# Patient Record
Sex: MALE | Race: OTHER | NOT HISPANIC OR LATINO
[De-identification: names, ages, dates, MRNs, and addresses within clinical notes are randomized per-mention and may not be internally consistent; named-entity substitution may affect disease eponyms.]

---

## 2019-07-23 PROBLEM — Z00.00 ENCOUNTER FOR PREVENTIVE HEALTH EXAMINATION: Status: ACTIVE | Noted: 2019-07-23

## 2019-07-25 ENCOUNTER — APPOINTMENT (OUTPATIENT)
Dept: CARDIOLOGY | Facility: CLINIC | Age: 74
End: 2019-07-25
Payer: MEDICARE

## 2019-07-25 VITALS
WEIGHT: 165 LBS | HEART RATE: 55 BPM | SYSTOLIC BLOOD PRESSURE: 142 MMHG | DIASTOLIC BLOOD PRESSURE: 76 MMHG | HEIGHT: 66 IN | BODY MASS INDEX: 26.52 KG/M2 | RESPIRATION RATE: 12 BRPM

## 2019-07-25 DIAGNOSIS — I25.10 ATHEROSCLEROTIC HEART DISEASE OF NATIVE CORONARY ARTERY W/OUT ANGINA PECTORIS: ICD-10-CM

## 2019-07-25 DIAGNOSIS — E78.5 HYPERLIPIDEMIA, UNSPECIFIED: ICD-10-CM

## 2019-07-25 DIAGNOSIS — Z95.1 PRESENCE OF AORTOCORONARY BYPASS GRAFT: ICD-10-CM

## 2019-07-25 DIAGNOSIS — I10 ESSENTIAL (PRIMARY) HYPERTENSION: ICD-10-CM

## 2019-07-25 PROCEDURE — 99205 OFFICE O/P NEW HI 60 MIN: CPT

## 2019-07-25 PROCEDURE — 93000 ELECTROCARDIOGRAM COMPLETE: CPT

## 2019-07-25 RX ORDER — ROSUVASTATIN CALCIUM 5 MG/1
5 TABLET, FILM COATED ORAL
Refills: 0 | Status: ACTIVE | COMMUNITY
Start: 2019-07-25

## 2019-07-25 RX ORDER — ASPIRIN 81 MG/1
81 TABLET ORAL
Refills: 0 | Status: ACTIVE | COMMUNITY
Start: 2019-07-25

## 2019-07-25 RX ORDER — METOPROLOL TARTRATE 25 MG/1
25 TABLET, FILM COATED ORAL
Refills: 0 | Status: ACTIVE | COMMUNITY
Start: 2019-07-25

## 2019-07-25 NOTE — PHYSICAL EXAM
[General Appearance - Well Developed] : well developed [Normal Appearance] : normal appearance [Well Groomed] : well groomed [General Appearance - Well Nourished] : well nourished [No Deformities] : no deformities [General Appearance - In No Acute Distress] : no acute distress [Normal Conjunctiva] : the conjunctiva exhibited no abnormalities [Eyelids - No Xanthelasma] : the eyelids demonstrated no xanthelasmas [Normal Oral Mucosa] : normal oral mucosa [No Oral Pallor] : no oral pallor [No Oral Cyanosis] : no oral cyanosis [Normal Jugular Venous A Waves Present] : normal jugular venous A waves present [Normal Jugular Venous V Waves Present] : normal jugular venous V waves present [No Jugular Venous Cain A Waves] : no jugular venous cain A waves [Heart Rate And Rhythm] : heart rate and rhythm were normal [Heart Sounds] : normal S1 and S2 [FreeTextEntry1] : 2/6 CARLOS RUSB midline scar [Respiration, Rhythm And Depth] : normal respiratory rhythm and effort [Exaggerated Use Of Accessory Muscles For Inspiration] : no accessory muscle use [Auscultation Breath Sounds / Voice Sounds] : lungs were clear to auscultation bilaterally [Abdomen Soft] : soft [Abdomen Tenderness] : non-tender [Abdomen Mass (___ Cm)] : no abdominal mass palpated [Nail Clubbing] : no clubbing of the fingernails [Cyanosis, Localized] : no localized cyanosis [Petechial Hemorrhages (___cm)] : no petechial hemorrhages [Skin Color & Pigmentation] : normal skin color and pigmentation [] : no rash [No Venous Stasis] : no venous stasis [Skin Lesions] : no skin lesions [No Skin Ulcers] : no skin ulcer [No Xanthoma] : no  xanthoma was observed [Oriented To Time, Place, And Person] : oriented to person, place, and time [Affect] : the affect was normal [Mood] : the mood was normal [No Anxiety] : not feeling anxious

## 2019-07-25 NOTE — HISTORY OF PRESENT ILLNESS
[FreeTextEntry1] : 1. HTN: on medications, controlled.\par 2. Hyperlipidemia: on statin.\par 3. CAD CABG: patient is on ASA. Due to recent onset of SOB, patient was admitted to [facility]. Echo there showed moderate MR, moderate to sesvere TR, pHTN. Patient continues to have SOB with exertion, occurring on a daily basis, associated with bilateral leg edema and limiting his ET. His symptoms are increasing in severity and duration since the last month.

## 2019-07-25 NOTE — REASON FOR VISIT
[Initial Evaluation] : an initial evaluation of [Coronary Artery Disease] : coronary artery disease [CABG Follow-up] : bypass graft [Hyperlipidemia] : hyperlipidemia [Hypertension] : hypertension [FreeTextEntry1] : 73 M HTN DM hyperlipidemia CAD CABG with SOB.

## 2019-07-25 NOTE — DISCUSSION/SUMMARY
[FreeTextEntry1] : 73 M HTN hyperlipidemia CAD CABG 2009 DM with worsening SOB.\par CHECK NST TO ASSESS PERFUSION (limited ET).\par Continue meds for HTN and hyperlipidemia.\par Continue ASA.

## 2019-09-12 ENCOUNTER — APPOINTMENT (OUTPATIENT)
Dept: CARDIOLOGY | Facility: CLINIC | Age: 74
End: 2019-09-12
Payer: MEDICARE

## 2019-09-12 VITALS
WEIGHT: 173 LBS | BODY MASS INDEX: 27.8 KG/M2 | HEIGHT: 66 IN | RESPIRATION RATE: 16 BRPM | HEART RATE: 62 BPM | DIASTOLIC BLOOD PRESSURE: 60 MMHG | SYSTOLIC BLOOD PRESSURE: 122 MMHG

## 2019-09-12 PROCEDURE — 99214 OFFICE O/P EST MOD 30 MIN: CPT

## 2019-09-12 RX ORDER — NITROGLYCERIN 0.4 MG/1
0.4 TABLET SUBLINGUAL
Qty: 50 | Refills: 5 | Status: ACTIVE | COMMUNITY
Start: 2019-09-12 | End: 1900-01-01

## 2019-09-16 ENCOUNTER — APPOINTMENT (OUTPATIENT)
Dept: CARDIOLOGY | Facility: CLINIC | Age: 74
End: 2019-09-16

## 2019-09-26 NOTE — DISCUSSION/SUMMARY
[FreeTextEntry1] : 73 M HTN hyperlipidemia CAD CABG severe TR for f/u.\par Patient has recurrent exertional SOB and CP despite medical treatment. \par REFER FOR CARDIAC CATHETERIZATION DUE TO PROGRESSIVE ANGINA.\par Start NG prn for CP.\par Continue ASA and statin.\par Continue meds for HTN control.

## 2019-09-26 NOTE — REASON FOR VISIT
[Follow-Up - Clinic] : a clinic follow-up of [Coronary Artery Disease] : coronary artery disease [CABG Follow-up] : bypass graft [Hypertension] : hypertension [FreeTextEntry1] : 73 M HTN DM hyperlipidemia CAD CABG with SOB.

## 2019-09-26 NOTE — HISTORY OF PRESENT ILLNESS
[FreeTextEntry1] : 1. HTN: on medications, no SE noted. \par 2. Hyperlipidemia: on statin. LIpid profile is followed by PMD.\par 3. CAD CABG: patient is on ASA. Due to recent onset of SOB, patient was admitted to [facility]. Echo there showed moderate MR, moderate to sesvere TR, pHTN. Patient continues to have SOB with exertion, occurring on a daily basis, associated with bilateral leg edema and limiting his ET. His symptoms are increasing in severity and duration since the last month. \par  He had an NST. \par Patient now reports improvement in exertional SOB.\par

## 2019-09-26 NOTE — PHYSICAL EXAM
[Normal Appearance] : normal appearance [General Appearance - Well Developed] : well developed [Well Groomed] : well groomed [General Appearance - Well Nourished] : well nourished [General Appearance - In No Acute Distress] : no acute distress [No Deformities] : no deformities [Normal Conjunctiva] : the conjunctiva exhibited no abnormalities [Eyelids - No Xanthelasma] : the eyelids demonstrated no xanthelasmas [No Oral Pallor] : no oral pallor [Normal Oral Mucosa] : normal oral mucosa [No Oral Cyanosis] : no oral cyanosis [Normal Jugular Venous A Waves Present] : normal jugular venous A waves present [No Jugular Venous Cain A Waves] : no jugular venous cain A waves [Normal Jugular Venous V Waves Present] : normal jugular venous V waves present [Respiration, Rhythm And Depth] : normal respiratory rhythm and effort [Exaggerated Use Of Accessory Muscles For Inspiration] : no accessory muscle use [Auscultation Breath Sounds / Voice Sounds] : lungs were clear to auscultation bilaterally [Heart Rate And Rhythm] : heart rate and rhythm were normal [Heart Sounds] : normal S1 and S2 [Abdomen Tenderness] : non-tender [Abdomen Soft] : soft [Abdomen Mass (___ Cm)] : no abdominal mass palpated [Nail Clubbing] : no clubbing of the fingernails [Cyanosis, Localized] : no localized cyanosis [Skin Color & Pigmentation] : normal skin color and pigmentation [Petechial Hemorrhages (___cm)] : no petechial hemorrhages [No Venous Stasis] : no venous stasis [] : no rash [Skin Lesions] : no skin lesions [No Skin Ulcers] : no skin ulcer [No Xanthoma] : no  xanthoma was observed [Affect] : the affect was normal [Oriented To Time, Place, And Person] : oriented to person, place, and time [No Anxiety] : not feeling anxious [Mood] : the mood was normal [FreeTextEntry1] : 2/6 CARLOS RUSB midline scar

## 2019-09-27 VITALS
OXYGEN SATURATION: 100 % | SYSTOLIC BLOOD PRESSURE: 134 MMHG | WEIGHT: 173.06 LBS | HEIGHT: 65 IN | TEMPERATURE: 98 F | DIASTOLIC BLOOD PRESSURE: 61 MMHG | HEART RATE: 67 BPM

## 2019-09-27 RX ORDER — METOPROLOL TARTRATE 50 MG
1 TABLET ORAL
Qty: 0 | Refills: 0 | DISCHARGE

## 2019-09-27 RX ORDER — CHLORHEXIDINE GLUCONATE 213 G/1000ML
1 SOLUTION TOPICAL ONCE
Refills: 0 | Status: DISCONTINUED | OUTPATIENT
Start: 2019-10-01 | End: 2019-10-01

## 2019-09-27 NOTE — H&P ADULT - HISTORY OF PRESENT ILLNESS
SKELETON     75 yo male with a PMhx of HTN, hyperlipidemia, known CAD s/p CABG (when/where?), moderate MR, moderate to severe TR, pHTN presented to cardiologist with the complaint of worsening TABARES and bilateral leg edema. Patient’s symptoms are increasing in severity and duration since last month. Patient had recent admission to [facility] (when?) for SOB and had Echo revealing moderate MR, moderate to severe TR, pHTN (per MD office note).  NST (8/14/2019) revealed no evidence of myocardial ischemia or infarct, normal LVEF 66%. The amount of RV muscle activity seen is greater than normal and the possibility of right ventricular outflow tract obstruction including pulmonary HTN should be investigated.  In light of patient’s risk factors, known CAD and CCS Angina Class III Equivalent/NYHA Class III Symptoms, patient now presents for cardiac catheterization with possible intervention if clinically indicated. 75 yo male, former smoker with a PMhx of MDS (baseline Hemoglobin 7-9 per Dr. Ruiz, most recent Hemoglobin 7.8 9/12/2019 per Dr. Ruiz, with frequent blood transfusion- most recent ~ one month ago, on Procrit q weekly), HTN, hyperlipidemia, known CAD s/p CABG ([facility] 2009-report archived), moderate MR, moderate to severe TR, pHTN presented to cardiologist with the complaint of worsening TABARES when ambulating more than one block associated with overall fatigue and occasional right sided chest pain radiating to his shoulder with and without exertion x several months. Patient endorsed bilateral leg edema for which he takes Lasix 20 mg daily as needed. He denies any palpitations, PND, orthopnea, melena, abdominal pain, dizziness or syncope. Patient was admitted to [facility] 7/4/2019 for SOB and chest pain. He received Echo at that that time (7/1/2019—per MD office note) revealing LVEF 60-65%, dilated RV with reduced RV systolic function. Severe LAE, moderate MR, moderate to severe TR, moderate pHTN. Per patient he received IV Lasix and one unit of PRBC during admission. Patient had NST (8/14/2019) revealing no evidence of myocardial ischemia or infarct, normal LVEF 66%. The amount of RV muscle activity seen is greater than normal and the possibility of right ventricular outflow tract obstruction including pulmonary HTN should be investigated.  In light of patient’s risk factors, known CAD and CCS Angina Class III Equivalent/NYHA Class III Symptoms, patient now presents for cardiac catheterization with possible intervention if clinically indicated.   Of note: Dr. Ruiz has spoken with patient’s outpatient hematologist Carly Gonzalez; most recent Hemoglobin was 7.8 9/12/2019. Patient with a known history of MDS. Per Dr. Ruiz’s conversation with Dr. Gonzalez there is no contraindication to cardiac catheterization and may receive DAPT therapy if indicated. 75 yo male, former smoker with a PMhx of MDS (baseline Hemoglobin 7-9 per Dr. Ruiz, most recent Hemoglobin 7.8 9/12/2019 per Dr. Ruiz, with frequent blood transfusion- most recent ~ one month ago, on Procrit q weekly), HTN, hyperlipidemia, known CAD s/p CABG ([facility] 2009-report archived), moderate MR, moderate to severe TR, pHTN presented to cardiologist with the complaint of worsening TABARES when ambulating more than one block associated with overall fatigue and occasional right sided chest pain radiating to his shoulder with and without exertion x several months. Patient endorsed bilateral leg edema for which he takes Lasix 20 mg daily as needed. He denies any palpitations, PND, orthopnea, melena, abdominal pain, dizziness or syncope. Patient was admitted to [facility] 7/4/2019 for SOB and chest pain. He received Echo at that that time (7/1/2019—per MD office note) revealing diastolic heart failure with LVEF 60-65%, dilated RV with reduced RV systolic function. Severe LAE, moderate MR, moderate to severe TR, moderate pHTN. Per patient he received IV Lasix and one unit of PRBC during admission. Patient had NST (8/14/2019) revealing no evidence of myocardial ischemia or infarct, normal LVEF 66%. The amount of RV muscle activity seen is greater than normal and the possibility of right ventricular outflow tract obstruction including pulmonary HTN should be investigated.  In light of patient’s risk factors, known CAD and CCS Angina Class III Equivalent/NYHA Class III Symptoms, patient now presents for cardiac catheterization with possible intervention if clinically indicated.   Of note: Dr. Ruiz has spoken with patient’s outpatient hematologist Carly Gonzalez; most recent Hemoglobin was 7.8 9/12/2019. Patient with a known history of MDS. Per Dr. Ruiz’s conversation with Dr. Gonzalez there is no contraindication to cardiac catheterization and may receive DAPT therapy if indicated. 75 yo male, former smoker with a PMhx of MDS (baseline Hemoglobin 7-9 per Dr. Ruiz, most recent Hemoglobin 7.8 9/12/2019 per Dr. Ruiz, with frequent blood transfusion- most recent ~ one month ago, on Procrit q weekly), HTN, hyperlipidemia, diastolic heart failure, known CAD s/p CABG ([facility] 2009-report archived), moderate MR, moderate to severe TR, pHTN presented to cardiologist with the complaint of worsening TABARES when ambulating more than one block associated with overall fatigue and occasional right sided chest pain radiating to his shoulder with and without exertion x several months. Patient endorsed bilateral leg edema for which he takes Lasix 20 mg daily as needed. He denies any palpitations, PND, orthopnea, melena, abdominal pain, dizziness or syncope. Patient was admitted to [facility] 7/4/2019 for SOB and chest pain. He received Echo at that that time (7/1/2019—per MD office note) revealing LVEF 60-65%, dilated RV with reduced RV systolic function. Severe LAE, moderate MR, moderate to severe TR, moderate pHTN. Per patient he received IV Lasix and one unit of PRBC during admission. Patient had NST (8/14/2019) revealing no evidence of myocardial ischemia or infarct, normal LVEF 66%. The amount of RV muscle activity seen is greater than normal and the possibility of right ventricular outflow tract obstruction including pulmonary HTN should be investigated.  In light of patient’s risk factors, known CAD and CCS Angina Class III Equivalent/NYHA Class III Symptoms, patient now presents for cardiac catheterization with possible intervention if clinically indicated.   Of note: Dr. Ruiz has spoken with patient’s outpatient hematologist Carly Gonzalez; most recent Hemoglobin was 7.8 9/12/2019. Patient with a known history of MDS. Per Dr. Ruiz’s conversation with Dr. Gonzalez there is no contraindication to cardiac catheterization and may receive DAPT therapy if indicated.

## 2019-09-27 NOTE — H&P ADULT - ASSESSMENT
75 yo male, former smoker with a PMhx of MDS (baseline Hemoglobin 7-9 per Dr. Ruiz, most recent Hemoglobin 7.8 9/12/2019 per Dr. Ruiz, with frequent blood transfusion- most recent ~ one month ago, on Procrit q weekly), HTN, hyperlipidemia, known CAD s/p CABG ([facility] 2009-report archived), moderate MR, moderate to severe TR, pHTN presented to cardiologist with the complaint of worsening TABARES. In light of patient’s risk factors, known CAD and CCS Angina Class III Equivalent/NYHA Class III Symptoms, patient now presents for cardiac catheterization with possible intervention if clinically indicated.     - H/H = 7.0/21.1 -- baseline Hb = 7.0 due to patient's MDS. Denies BRBPR, melena, hematochezia, hematuria, hematemesis   - MD aware of patient's Hb and requested 325mg ASA pre procedure  - Cr = 1.19. History of diastolic HF with EF of 60-65%, moderate pHTN and moderate MR. IVF .45 NS @ 30cc/hr ordered to be started in the room  - Type of sedation: moderate  - Candidate for sedation: yes    Risks & benefits of procedure and alternative therapy have been explained to the patient including but not limited to: allergic reaction, bleeding w/possible need for blood transfusion, infection, renal and vascular compromise, limb damage, arrhythmia, stroke, vessel dissection/perforation, Myocardial infarction, emergent CABG. Informed consent obtained and in chart. 75 yo male, former smoker with a PMhx of MDS (baseline Hemoglobin 7-9 per Dr. Ruiz, most recent Hemoglobin 7.8 9/12/2019 per Dr. Ruiz, with frequent blood transfusion- most recent ~ one month ago, on Procrit q weekly), HTN, hyperlipidemia, history of admission to [facility] for diastolic heart failure exacerbation (7/2019), known CAD s/p CABG ([facility] 2009-report archived), moderate MR, moderate to severe TR, pHTN presented to cardiologist with the complaint of worsening TABARES. In light of patient’s risk factors, known CAD and CCS Angina Class III Equivalent/NYHA Class III Symptoms, patient now presents for cardiac catheterization with possible intervention if clinically indicated.     - H/H = 7.0/21.1 -- baseline Hgb = 7.0 due to patient's MDS. Denies BRBPR, melena, hematochezia, hematuria, hematemesis   - MD aware of patient's Hb and requested 325mg ASA pre procedure  - Cr = 1.19. History of diastolic HF with EF of 60-65%, moderate pHTN and moderate MR. IVF .45 NS @ 30cc/hr ordered to be started in the room after EDP obtained  - Type of sedation: moderate  - Candidate for sedation: yes    Risks & benefits of procedure and alternative therapy have been explained to the patient including but not limited to: allergic reaction, bleeding w/possible need for blood transfusion, infection, renal and vascular compromise, limb damage, arrhythmia, stroke, vessel dissection/perforation, Myocardial infarction, emergent CABG. Informed consent obtained and in chart.

## 2019-09-27 NOTE — H&P ADULT - RS GEN PE MLT RESP DETAILS PC
respirations non-labored/clear to auscultation bilaterally/good air movement/airway patent/breath sounds equal/normal

## 2019-10-01 ENCOUNTER — OUTPATIENT (OUTPATIENT)
Dept: OUTPATIENT SERVICES | Facility: HOSPITAL | Age: 74
LOS: 1 days | Discharge: NURSING, REHAB OR LONG-TERM CARE | End: 2019-10-01
Payer: MEDICARE

## 2019-10-01 DIAGNOSIS — Z95.1 PRESENCE OF AORTOCORONARY BYPASS GRAFT: Chronic | ICD-10-CM

## 2019-10-01 LAB
ALBUMIN SERPL ELPH-MCNC: 4.8 G/DL — SIGNIFICANT CHANGE UP (ref 3.3–5)
ALP SERPL-CCNC: 62 U/L — SIGNIFICANT CHANGE UP (ref 40–120)
ALT FLD-CCNC: 19 U/L — SIGNIFICANT CHANGE UP (ref 10–45)
ANION GAP SERPL CALC-SCNC: 10 MMOL/L — SIGNIFICANT CHANGE UP (ref 5–17)
APTT BLD: 32.8 SEC — SIGNIFICANT CHANGE UP (ref 27.5–36.3)
AST SERPL-CCNC: 28 U/L — SIGNIFICANT CHANGE UP (ref 10–40)
BASOPHILS # BLD AUTO: 0.03 K/UL — SIGNIFICANT CHANGE UP (ref 0–0.2)
BASOPHILS NFR BLD AUTO: 0.8 % — SIGNIFICANT CHANGE UP (ref 0–2)
BILIRUB SERPL-MCNC: 0.6 MG/DL — SIGNIFICANT CHANGE UP (ref 0.2–1.2)
BUN SERPL-MCNC: 20 MG/DL — SIGNIFICANT CHANGE UP (ref 7–23)
CALCIUM SERPL-MCNC: 9.7 MG/DL — SIGNIFICANT CHANGE UP (ref 8.4–10.5)
CHLORIDE SERPL-SCNC: 104 MMOL/L — SIGNIFICANT CHANGE UP (ref 96–108)
CHOLEST SERPL-MCNC: 138 MG/DL — SIGNIFICANT CHANGE UP (ref 10–199)
CK MB CFR SERPL CALC: 2 NG/ML — SIGNIFICANT CHANGE UP (ref 0–6.7)
CO2 SERPL-SCNC: 27 MMOL/L — SIGNIFICANT CHANGE UP (ref 22–31)
CREAT SERPL-MCNC: 1.19 MG/DL — SIGNIFICANT CHANGE UP (ref 0.5–1.3)
CRP SERPL-MCNC: 0.09 MG/DL — SIGNIFICANT CHANGE UP (ref 0–0.4)
EOSINOPHIL # BLD AUTO: 0.27 K/UL — SIGNIFICANT CHANGE UP (ref 0–0.5)
EOSINOPHIL NFR BLD AUTO: 7.2 % — HIGH (ref 0–6)
GLUCOSE SERPL-MCNC: 103 MG/DL — HIGH (ref 70–99)
HBA1C BLD-MCNC: 6.3 % — HIGH (ref 4–5.6)
HCT VFR BLD CALC: 21.1 % — LOW (ref 39–50)
HDLC SERPL-MCNC: 59 MG/DL — SIGNIFICANT CHANGE UP
HGB BLD-MCNC: 7 G/DL — CRITICAL LOW (ref 13–17)
IMM GRANULOCYTES NFR BLD AUTO: 0.5 % — SIGNIFICANT CHANGE UP (ref 0–1.5)
INR BLD: 1.2 — HIGH (ref 0.88–1.16)
LIPID PNL WITH DIRECT LDL SERPL: 63 MG/DL — SIGNIFICANT CHANGE UP
LYMPHOCYTES # BLD AUTO: 1.8 K/UL — SIGNIFICANT CHANGE UP (ref 1–3.3)
LYMPHOCYTES # BLD AUTO: 48.1 % — HIGH (ref 13–44)
MCHC RBC-ENTMCNC: 33.2 GM/DL — SIGNIFICANT CHANGE UP (ref 32–36)
MCHC RBC-ENTMCNC: 35.9 PG — HIGH (ref 27–34)
MCV RBC AUTO: 108.2 FL — HIGH (ref 80–100)
MONOCYTES # BLD AUTO: 0.51 K/UL — SIGNIFICANT CHANGE UP (ref 0–0.9)
MONOCYTES NFR BLD AUTO: 13.6 % — SIGNIFICANT CHANGE UP (ref 2–14)
NEUTROPHILS # BLD AUTO: 1.11 K/UL — LOW (ref 1.8–7.4)
NEUTROPHILS NFR BLD AUTO: 29.8 % — LOW (ref 43–77)
NRBC # BLD: 0 /100 WBCS — SIGNIFICANT CHANGE UP (ref 0–0)
PLATELET # BLD AUTO: 194 K/UL — SIGNIFICANT CHANGE UP (ref 150–400)
POTASSIUM SERPL-MCNC: 3.9 MMOL/L — SIGNIFICANT CHANGE UP (ref 3.5–5.3)
POTASSIUM SERPL-SCNC: 3.9 MMOL/L — SIGNIFICANT CHANGE UP (ref 3.5–5.3)
PROT SERPL-MCNC: 7.5 G/DL — SIGNIFICANT CHANGE UP (ref 6–8.3)
PROTHROM AB SERPL-ACNC: 13.6 SEC — HIGH (ref 10–12.9)
RBC # BLD: 1.95 M/UL — LOW (ref 4.2–5.8)
RBC # FLD: 24.9 % — HIGH (ref 10.3–14.5)
SODIUM SERPL-SCNC: 141 MMOL/L — SIGNIFICANT CHANGE UP (ref 135–145)
TOTAL CHOLESTEROL/HDL RATIO MEASUREMENT: 2.3 RATIO — LOW (ref 3.4–9.6)
TRIGL SERPL-MCNC: 78 MG/DL — SIGNIFICANT CHANGE UP (ref 10–149)
WBC # BLD: 3.74 K/UL — LOW (ref 3.8–10.5)
WBC # FLD AUTO: 3.74 K/UL — LOW (ref 3.8–10.5)

## 2019-10-01 PROCEDURE — 85610 PROTHROMBIN TIME: CPT

## 2019-10-01 PROCEDURE — C1760: CPT

## 2019-10-01 PROCEDURE — C1887: CPT

## 2019-10-01 PROCEDURE — 80061 LIPID PANEL: CPT

## 2019-10-01 PROCEDURE — C1894: CPT

## 2019-10-01 PROCEDURE — 86140 C-REACTIVE PROTEIN: CPT

## 2019-10-01 PROCEDURE — 80053 COMPREHEN METABOLIC PANEL: CPT

## 2019-10-01 PROCEDURE — 82962 GLUCOSE BLOOD TEST: CPT

## 2019-10-01 PROCEDURE — 83036 HEMOGLOBIN GLYCOSYLATED A1C: CPT

## 2019-10-01 PROCEDURE — C1769: CPT

## 2019-10-01 PROCEDURE — 93459 L HRT ART/GRFT ANGIO: CPT

## 2019-10-01 PROCEDURE — 85025 COMPLETE CBC W/AUTO DIFF WBC: CPT

## 2019-10-01 PROCEDURE — 93005 ELECTROCARDIOGRAM TRACING: CPT

## 2019-10-01 PROCEDURE — 93010 ELECTROCARDIOGRAM REPORT: CPT

## 2019-10-01 PROCEDURE — 82553 CREATINE MB FRACTION: CPT

## 2019-10-01 PROCEDURE — 93459 L HRT ART/GRFT ANGIO: CPT | Mod: 26

## 2019-10-01 PROCEDURE — 99235 HOSP IP/OBS SAME DATE MOD 70: CPT

## 2019-10-01 PROCEDURE — 85730 THROMBOPLASTIN TIME PARTIAL: CPT

## 2019-10-01 PROCEDURE — 82550 ASSAY OF CK (CPK): CPT

## 2019-10-01 RX ORDER — FUROSEMIDE 40 MG
1 TABLET ORAL
Qty: 0 | Refills: 0 | DISCHARGE

## 2019-10-01 RX ORDER — ASPIRIN/CALCIUM CARB/MAGNESIUM 324 MG
325 TABLET ORAL ONCE
Refills: 0 | Status: COMPLETED | OUTPATIENT
Start: 2019-10-01 | End: 2019-10-01

## 2019-10-01 RX ORDER — PYRIDOXINE HCL (VITAMIN B6) 100 MG
1 TABLET ORAL
Qty: 0 | Refills: 0 | DISCHARGE

## 2019-10-01 RX ORDER — ERYTHROPOIETIN 10000 [IU]/ML
40000 INJECTION, SOLUTION INTRAVENOUS; SUBCUTANEOUS
Qty: 0 | Refills: 0 | DISCHARGE

## 2019-10-01 RX ORDER — ROSUVASTATIN CALCIUM 5 MG/1
1 TABLET ORAL
Qty: 0 | Refills: 0 | DISCHARGE

## 2019-10-01 RX ORDER — METOPROLOL TARTRATE 50 MG
1 TABLET ORAL
Qty: 0 | Refills: 0 | DISCHARGE

## 2019-10-01 RX ORDER — ERYTHROPOIETIN 10000 [IU]/ML
0 INJECTION, SOLUTION INTRAVENOUS; SUBCUTANEOUS
Qty: 0 | Refills: 0 | DISCHARGE

## 2019-10-01 RX ORDER — SODIUM CHLORIDE 9 MG/ML
500 INJECTION, SOLUTION INTRAVENOUS
Refills: 0 | Status: DISCONTINUED | OUTPATIENT
Start: 2019-10-01 | End: 2019-10-01

## 2019-10-01 RX ORDER — SODIUM CHLORIDE 9 MG/ML
500 INJECTION INTRAMUSCULAR; INTRAVENOUS; SUBCUTANEOUS
Refills: 0 | Status: DISCONTINUED | OUTPATIENT
Start: 2019-10-01 | End: 2019-10-01

## 2019-10-01 RX ORDER — ASPIRIN/CALCIUM CARB/MAGNESIUM 324 MG
1 TABLET ORAL
Qty: 0 | Refills: 0 | DISCHARGE

## 2019-10-01 RX ORDER — FOLIC ACID 0.8 MG
1 TABLET ORAL
Qty: 0 | Refills: 0 | DISCHARGE

## 2019-10-01 RX ORDER — NITROGLYCERIN 6.5 MG
1 CAPSULE, EXTENDED RELEASE ORAL
Qty: 0 | Refills: 0 | DISCHARGE

## 2019-10-01 RX ADMIN — SODIUM CHLORIDE 30 MILLILITER(S): 9 INJECTION, SOLUTION INTRAVENOUS at 14:57

## 2019-10-01 RX ADMIN — Medication 325 MILLIGRAM(S): at 14:57

## 2019-10-01 NOTE — PROGRESS NOTE ADULT - SUBJECTIVE AND OBJECTIVE BOX
Interventional Cardiology PA SDA Discharge Note    Patient without complaints. Ambulated and voided without difficulties    Afebrile, VSS    Ext:   Righ groin perlcosed. No hematoma, no bleeding, dressing; C/D/I  Pulses:    intact RAD/DP/PT back to baseline     A/P:   75 yo male, former smoker with a PMhx of MDS (baseline Hemoglobin 7-9 per Dr. Ruiz, most recent Hemoglobin 7.8 9/12/2019 per Dr. Ruiz, with frequent blood transfusion- most recent ~ one month ago, on Procrit q weekly), HTN, hyperlipidemia, known CAD s/p CABG ([facility] 2009-report archived), moderate MR, moderate to severe TR, pHTN presented to cardiologist with the complaint of worsening TABARES when ambulating more than one block associated with overall fatigue and occasional right sided chest pain radiating to his shoulder with and without exertion x several months. Patient endorsed bilateral leg edema for which he takes Lasix 20 mg daily as needed. He denies any palpitations, PND, orthopnea, melena, abdominal pain, dizziness or syncope. Patient was admitted to [facility] 7/4/2019 for SOB and chest pain. He received Echo at that that time (7/1/2019—per MD office note) revealing diastolic heart failure with LVEF 60-65%, dilated RV with reduced RV systolic function. Severe LAE, moderate MR, moderate to severe TR, moderate pHTN. Per patient he received IV Lasix and one unit of PRBC during admission. Patient had NST (8/14/2019) revealing no evidence of myocardial ischemia or infarct, normal LVEF 66%. The amount of RV muscle activity seen is greater than normal and the possibility of right ventricular outflow tract obstruction including pulmonary HTN should be investigated.  In light of patient’s risk factors, known CAD and CCS Angina Class III Equivalent/NYHA Class III Symptoms, patient now presents for cardiac catheterization with possible intervention if clinically indicated.  Of note: Dr. Ruiz has spoken with patient’s outpatient hematologist Carly Gonzalez; most recent Hemoglobin was 7.8 9/12/2019. Patient with a known history of MDS. Per Dr. Ruiz’s conversation with Dr. Gonzalez there is no contraindication to cardiac catheterization and may receive DAPT therapy if indicated.     Pt is s/p Diagnostic Cath 10/1/19.  Pt is to continue current medications as prescribed.     1.	Stable for discharge as per attending Dr. Gupta after bed rest, pt voids, groin/wrist stable and 30 minutes of ambulation.  2.	Follow-up with Cardiologist in 1-2 weeks  3.	Discharged forms signed and copies in chart Interventional Cardiology PA SDA Discharge Note    Patient without complaints. Ambulated and voided without difficulties    Afebrile, VSS    Ext:   Righ groin perlcosed. No hematoma, no bleeding, dressing; C/D/I  Pulses:    intact RAD/DP/PT back to baseline     A/P:   73 yo male, former smoker with a PMhx of MDS (baseline Hemoglobin 7-9 per Dr. Ruiz, most recent Hemoglobin 7.8 9/12/2019 per Dr. Ruiz, with frequent blood transfusion- most recent ~ one month ago, on Procrit q weekly), HTN, hyperlipidemia, known CAD s/p CABG ([facility] 2009-report archived), moderate MR, moderate to severe TR, pHTN presented to cardiologist with the complaint of worsening TABARES when ambulating more than one block associated with overall fatigue and occasional right sided chest pain radiating to his shoulder with and without exertion x several months. Patient endorsed bilateral leg edema for which he takes Lasix 20 mg daily as needed. He denies any palpitations, PND, orthopnea, melena, abdominal pain, dizziness or syncope. Patient was admitted to [facility] 7/4/2019 for SOB and chest pain. He received Echo at that that time (7/1/2019—per MD office note) revealing diastolic heart failure with LVEF 60-65%, dilated RV with reduced RV systolic function. Severe LAE, moderate MR, moderate to severe TR, moderate pHTN. Per patient he received IV Lasix and one unit of PRBC during admission. Patient had NST (8/14/2019) revealing no evidence of myocardial ischemia or infarct, normal LVEF 66%. The amount of RV muscle activity seen is greater than normal and the possibility of right ventricular outflow tract obstruction including pulmonary HTN should be investigated.  In light of patient’s risk factors, known CAD and CCS Angina Class III Equivalent/NYHA Class III Symptoms, patient now presents for cardiac catheterization with possible intervention if clinically indicated.  Of note: Dr. Ruiz has spoken with patient’s outpatient hematologist Carly Gonzalez; most recent Hemoglobin was 7.8 9/12/2019. Patient with a known history of MDS. Per Dr. Ruiz’s conversation with Dr. Gonzalez there is no contraindication to cardiac catheterization and may receive DAPT therapy if indicated.     Pt is s/p Diagnostic Cath 10/1/19 showing patent SVG to Ramus, patent SVG to RCA and patent LIMA to LAD.   Pt is to continue current medications as prescribed.     1.	Stable for discharge as per attending Dr. Gupta after bed rest, pt voids, groin/wrist stable and 30 minutes of ambulation.  2.	Follow-up with Cardiologist in 1-2 weeks  3.	Discharged forms signed and copies in chart

## 2019-10-02 PROBLEM — I25.10 ATHEROSCLEROTIC HEART DISEASE OF NATIVE CORONARY ARTERY WITHOUT ANGINA PECTORIS: Chronic | Status: ACTIVE | Noted: 2019-09-27

## 2019-10-02 PROBLEM — E78.5 HYPERLIPIDEMIA, UNSPECIFIED: Chronic | Status: ACTIVE | Noted: 2019-09-27

## 2019-10-02 PROBLEM — I10 ESSENTIAL (PRIMARY) HYPERTENSION: Chronic | Status: ACTIVE | Noted: 2019-09-27

## 2019-10-10 ENCOUNTER — APPOINTMENT (OUTPATIENT)
Dept: CARDIOLOGY | Facility: CLINIC | Age: 74
End: 2019-10-10
Payer: MEDICARE

## 2019-10-10 VITALS
RESPIRATION RATE: 12 BRPM | HEART RATE: 75 BPM | DIASTOLIC BLOOD PRESSURE: 72 MMHG | BODY MASS INDEX: 27.32 KG/M2 | SYSTOLIC BLOOD PRESSURE: 133 MMHG | WEIGHT: 170 LBS | HEIGHT: 66 IN

## 2019-10-10 PROCEDURE — 99214 OFFICE O/P EST MOD 30 MIN: CPT

## 2019-10-10 NOTE — HISTORY OF PRESENT ILLNESS
[FreeTextEntry1] : 1. HTN: on medications, controlled. \par 2. Hyperlipidemia: on statin. No muscle pain noted. \par 3. CAD CABG: patient is on ASA. Due to recent onset of SOB, patient was admitted to [facility]. Echo there showed moderate MR, moderate to severe TR, pHTN. \par Patient underwent cardiac cath at [facility] on 10-1 that showed patent grafts with non-obstructive CAD.\par His SOB has improved and symptoms are attributed to chronic anemia.

## 2019-10-10 NOTE — PHYSICAL EXAM
[General Appearance - Well Developed] : well developed [Normal Appearance] : normal appearance [General Appearance - Well Nourished] : well nourished [Well Groomed] : well groomed [General Appearance - In No Acute Distress] : no acute distress [No Deformities] : no deformities [Normal Conjunctiva] : the conjunctiva exhibited no abnormalities [Eyelids - No Xanthelasma] : the eyelids demonstrated no xanthelasmas [Normal Oral Mucosa] : normal oral mucosa [No Oral Cyanosis] : no oral cyanosis [No Oral Pallor] : no oral pallor [Normal Jugular Venous A Waves Present] : normal jugular venous A waves present [Normal Jugular Venous V Waves Present] : normal jugular venous V waves present [No Jugular Venous Cain A Waves] : no jugular venous cain A waves [Respiration, Rhythm And Depth] : normal respiratory rhythm and effort [Exaggerated Use Of Accessory Muscles For Inspiration] : no accessory muscle use [Auscultation Breath Sounds / Voice Sounds] : lungs were clear to auscultation bilaterally [Heart Rate And Rhythm] : heart rate and rhythm were normal [FreeTextEntry1] : 2/6 CARLOS RUSB midline scar [Heart Sounds] : normal S1 and S2 [Abdomen Tenderness] : non-tender [Abdomen Soft] : soft [Nail Clubbing] : no clubbing of the fingernails [Abdomen Mass (___ Cm)] : no abdominal mass palpated [Petechial Hemorrhages (___cm)] : no petechial hemorrhages [Cyanosis, Localized] : no localized cyanosis [Skin Color & Pigmentation] : normal skin color and pigmentation [] : no rash [Skin Lesions] : no skin lesions [No Venous Stasis] : no venous stasis [No Xanthoma] : no  xanthoma was observed [No Skin Ulcers] : no skin ulcer [Oriented To Time, Place, And Person] : oriented to person, place, and time [Affect] : the affect was normal [No Anxiety] : not feeling anxious [Mood] : the mood was normal

## 2019-10-10 NOTE — REASON FOR VISIT
[Follow-Up - Clinic] : a clinic follow-up of [Coronary Artery Disease] : coronary artery disease [CABG Follow-up] : bypass graft [Hyperlipidemia] : hyperlipidemia [Hypertension] : hypertension [FreeTextEntry1] : 74 M HTN DM hyperlipidemia CAD CABG with SOB. \par [phone]\par Dr. Ruiz Gonzalez

## 2019-10-10 NOTE — DISCUSSION/SUMMARY
[FreeTextEntry1] : 74 M HTN hyperlipidemia CAD s/p CABG for f/u.\par Continue meds for HTN.\par Continue ASA for CAD.\par Continue statin.\par Symptoms likely to be due to anemia.\par PMD will f/u.

## 2019-10-10 NOTE — REVIEW OF SYSTEMS
[Shortness Of Breath] : shortness of breath [Dyspnea on exertion] : dyspnea during exertion [Lower Ext Edema] : lower extremity edema [Negative] : Endocrine

## 2019-12-05 ENCOUNTER — APPOINTMENT (OUTPATIENT)
Dept: CARDIOLOGY | Facility: CLINIC | Age: 74
End: 2019-12-05
Payer: MEDICARE

## 2019-12-05 VITALS
HEIGHT: 66 IN | WEIGHT: 178 LBS | RESPIRATION RATE: 15 BRPM | HEART RATE: 86 BPM | SYSTOLIC BLOOD PRESSURE: 122 MMHG | DIASTOLIC BLOOD PRESSURE: 60 MMHG | BODY MASS INDEX: 28.61 KG/M2

## 2019-12-05 PROCEDURE — 99214 OFFICE O/P EST MOD 30 MIN: CPT

## 2019-12-05 NOTE — DISCUSSION/SUMMARY
[FreeTextEntry1] : 74 M HTN hyperlipidemia DM CAD CABG for f/u.\par Non obstructive CAD with patent stents on cath.\par Continue meds for HTN and hyperlipidemia.\par Dm control.\par F/u PMD.

## 2019-12-05 NOTE — REASON FOR VISIT
[Follow-Up - Clinic] : a clinic follow-up of [CABG Follow-up] : bypass graft [Coronary Artery Disease] : coronary artery disease [Hypertension] : hypertension [Hyperlipidemia] : hyperlipidemia [FreeTextEntry1] : 74 M HTN DM hyperlipidemia CAD CABG with SOB. \par [phone]\par Dr. Ruiz Gonzalez \par

## 2019-12-05 NOTE — HISTORY OF PRESENT ILLNESS
[FreeTextEntry1] : 1. HTN: on medications, no SE noted. \par 2. Hyperlipidemia: on statin. Lipid profile is followed by PMD. \par 3. CAD CABG: patient is on ASA. Due to recent onset of SOB, patient was admitted to [facility]. Echo there showed moderate MR, moderate to severe TR, pHTN. \par Patient underwent cardiac cath at [facility] on 10-1 that showed patent grafts with non-obstructive CAD.\par \par Patient denies CP or SOB. ET is relatively stable.\par He is compliant with medications.

## 2019-12-05 NOTE — PHYSICAL EXAM
[General Appearance - Well Developed] : well developed [Well Groomed] : well groomed [Normal Appearance] : normal appearance [General Appearance - Well Nourished] : well nourished [General Appearance - In No Acute Distress] : no acute distress [No Deformities] : no deformities [Normal Conjunctiva] : the conjunctiva exhibited no abnormalities [Eyelids - No Xanthelasma] : the eyelids demonstrated no xanthelasmas [No Oral Pallor] : no oral pallor [Normal Oral Mucosa] : normal oral mucosa [Normal Jugular Venous A Waves Present] : normal jugular venous A waves present [Normal Jugular Venous V Waves Present] : normal jugular venous V waves present [No Oral Cyanosis] : no oral cyanosis [No Jugular Venous Cain A Waves] : no jugular venous cain A waves [Exaggerated Use Of Accessory Muscles For Inspiration] : no accessory muscle use [Respiration, Rhythm And Depth] : normal respiratory rhythm and effort [Auscultation Breath Sounds / Voice Sounds] : lungs were clear to auscultation bilaterally [Heart Rate And Rhythm] : heart rate and rhythm were normal [Heart Sounds] : normal S1 and S2 [FreeTextEntry1] : 2/6 CARLOS RUSB midline scar [Abdomen Soft] : soft [Abdomen Tenderness] : non-tender [Abdomen Mass (___ Cm)] : no abdominal mass palpated [Nail Clubbing] : no clubbing of the fingernails [Cyanosis, Localized] : no localized cyanosis [Petechial Hemorrhages (___cm)] : no petechial hemorrhages [Skin Color & Pigmentation] : normal skin color and pigmentation [No Venous Stasis] : no venous stasis [] : no rash [No Skin Ulcers] : no skin ulcer [Skin Lesions] : no skin lesions [No Xanthoma] : no  xanthoma was observed [Oriented To Time, Place, And Person] : oriented to person, place, and time [Mood] : the mood was normal [Affect] : the affect was normal [No Anxiety] : not feeling anxious

## 2019-12-05 NOTE — REVIEW OF SYSTEMS
[Dyspnea on exertion] : dyspnea during exertion [Shortness Of Breath] : shortness of breath [Lower Ext Edema] : lower extremity edema [Negative] : Endocrine

## 2020-02-06 ENCOUNTER — APPOINTMENT (OUTPATIENT)
Dept: CARDIOLOGY | Facility: CLINIC | Age: 75
End: 2020-02-06

## 2021-05-20 ENCOUNTER — APPOINTMENT (OUTPATIENT)
Dept: CARDIOLOGY | Facility: CLINIC | Age: 76
End: 2021-05-20
Payer: MEDICARE

## 2021-05-20 VITALS
RESPIRATION RATE: 12 BRPM | SYSTOLIC BLOOD PRESSURE: 95 MMHG | HEART RATE: 82 BPM | HEIGHT: 66 IN | BODY MASS INDEX: 28.12 KG/M2 | WEIGHT: 175 LBS | DIASTOLIC BLOOD PRESSURE: 62 MMHG

## 2021-05-20 PROCEDURE — 99072 ADDL SUPL MATRL&STAF TM PHE: CPT

## 2021-05-20 PROCEDURE — 99214 OFFICE O/P EST MOD 30 MIN: CPT

## 2021-05-20 NOTE — DISCUSSION/SUMMARY
[FreeTextEntry1] : 75 M HTN hyperlipidemia CAD s/p CABG with pleural effusion. \par Will review CXR.\par Continue medications for HTN.\par Continue statin.\par Continue ASA for CAD.

## 2021-05-20 NOTE — REASON FOR VISIT
[Hyperlipidemia] : hyperlipidemia [Hypertension] : hypertension [Coronary Artery Disease] : coronary artery disease [FreeTextEntry1] : 75 M HTN DM hyperlipidemia CAD CABG with SOB. \par [phone]\par Dr. Ruiz Gonzalez \par

## 2021-05-20 NOTE — PHYSICAL EXAM
[Well Developed] : well developed [Well Nourished] : well nourished [No Acute Distress] : no acute distress [Normal Conjunctiva] : normal conjunctiva [Normal Venous Pressure] : normal venous pressure [No Carotid Bruit] : no carotid bruit [Normal S1, S2] : normal S1, S2 [No Rub] : no rub [No Gallop] : no gallop [Clear Lung Fields] : clear lung fields [Good Air Entry] : good air entry [No Respiratory Distress] : no respiratory distress  [Soft] : abdomen soft [Non Tender] : non-tender [No Masses/organomegaly] : no masses/organomegaly [Normal Bowel Sounds] : normal bowel sounds [Normal Gait] : normal gait [No Edema] : no edema [No Cyanosis] : no cyanosis [No Clubbing] : no clubbing [No Varicosities] : no varicosities [No Rash] : no rash [No Skin Lesions] : no skin lesions [Moves all extremities] : moves all extremities [No Focal Deficits] : no focal deficits [Normal Speech] : normal speech [Alert and Oriented] : alert and oriented [Normal memory] : normal memory [de-identified] : midline scar, 2/6 CARLOS RUSB

## 2021-05-20 NOTE — HISTORY OF PRESENT ILLNESS
[FreeTextEntry1] :  \par 1. HTN: on medications, controlled. \par 2. Hyperlipidemia: on statin. No muscle pain is noted. \par 3. CAD CABG: patient is on ASA. The patient was admitted to [facility] in 2019. Echo there showed moderate MR, moderate to severe TR, pHTN. \par Patient underwent cardiac cath at [facility] on  that showed patent grafts with non-obstructive CAD.\par \par The patient was away for 1.5 years in the [facility]. He was found to have a small pleural effusion. \par The patient denies any CP or SOB.\par No cough or fevers noted.\par ET is limited.

## 2021-06-24 ENCOUNTER — APPOINTMENT (OUTPATIENT)
Dept: CARDIOLOGY | Facility: CLINIC | Age: 76
End: 2021-06-24
Payer: MEDICARE

## 2021-06-24 VITALS
RESPIRATION RATE: 15 BRPM | HEIGHT: 66 IN | SYSTOLIC BLOOD PRESSURE: 122 MMHG | WEIGHT: 175 LBS | DIASTOLIC BLOOD PRESSURE: 60 MMHG | HEART RATE: 71 BPM | BODY MASS INDEX: 28.12 KG/M2

## 2021-06-24 PROCEDURE — 99072 ADDL SUPL MATRL&STAF TM PHE: CPT

## 2021-06-24 PROCEDURE — 99214 OFFICE O/P EST MOD 30 MIN: CPT

## 2021-06-24 NOTE — HISTORY OF PRESENT ILLNESS
[FreeTextEntry1] :  \par 1. HTN: on medications, no SE noted. \par 2. Hyperlipidemia: on statin. \par 3. CAD CABG: patient is on ASA. The patient was admitted to [facility] in 2019. Echo there showed moderate MR, moderate to severe TR, pHTN. \par Patient underwent cardiac cath at [facility] on  that showed patent grafts with non-obstructive CAD.\par \par The patient was away for 1.5 years in the [facility]. He was found to have a small pleural effusion. \par \par The patient denies CP or SOB. No cough or fevers noted. ET is stable.\par The patient is compliant with medications.\par He received his COVID vaccine.

## 2021-06-24 NOTE — REASON FOR VISIT
[Hyperlipidemia] : hyperlipidemia [Hypertension] : hypertension [Coronary Artery Disease] : coronary artery disease [FreeTextEntry1] : 75 M HTN DM hyperlipidemia CAD CABG with SOB. \par [phone]\par Dr. Ruiz Gonzalez \par \par

## 2021-06-24 NOTE — PHYSICAL EXAM
[Well Developed] : well developed [Well Nourished] : well nourished [No Acute Distress] : no acute distress [Normal Conjunctiva] : normal conjunctiva [Normal Venous Pressure] : normal venous pressure [No Carotid Bruit] : no carotid bruit [Normal S1, S2] : normal S1, S2 [No Rub] : no rub [No Gallop] : no gallop [Clear Lung Fields] : clear lung fields [Good Air Entry] : good air entry [No Respiratory Distress] : no respiratory distress  [Soft] : abdomen soft [Non Tender] : non-tender [No Masses/organomegaly] : no masses/organomegaly [Normal Bowel Sounds] : normal bowel sounds [Normal Gait] : normal gait [No Edema] : no edema [No Cyanosis] : no cyanosis [No Clubbing] : no clubbing [No Varicosities] : no varicosities [No Rash] : no rash [No Skin Lesions] : no skin lesions [Moves all extremities] : moves all extremities [No Focal Deficits] : no focal deficits [Normal Speech] : normal speech [Alert and Oriented] : alert and oriented [Normal memory] : normal memory [de-identified] : midline scar

## 2021-06-24 NOTE — DISCUSSION/SUMMARY
[FreeTextEntry1] : 75 M HTN DM hyperlipidemia CAD CABG for f/u. \par Continue ASA for CAD.\par Continue medications for HTN and hyperlipidemia.\par Continue DM control.\par Patient received his COVID vaccine. \par

## 2021-08-19 ENCOUNTER — APPOINTMENT (OUTPATIENT)
Dept: CARDIOLOGY | Facility: CLINIC | Age: 76
End: 2021-08-19
Payer: MEDICARE

## 2021-08-19 VITALS
DIASTOLIC BLOOD PRESSURE: 78 MMHG | SYSTOLIC BLOOD PRESSURE: 126 MMHG | HEART RATE: 87 BPM | RESPIRATION RATE: 12 BRPM | HEIGHT: 66 IN | BODY MASS INDEX: 27.97 KG/M2 | WEIGHT: 174 LBS

## 2021-08-19 PROCEDURE — 99214 OFFICE O/P EST MOD 30 MIN: CPT

## 2021-08-19 NOTE — REASON FOR VISIT
[Hyperlipidemia] : hyperlipidemia [Hypertension] : hypertension [Coronary Artery Disease] : coronary artery disease [FreeTextEntry1] : 75 M HTN DM hyperlipidemia CAD CABG for f/u.\par [phone]\par Dr. Ruiz Gonzalez \par

## 2021-08-19 NOTE — HISTORY OF PRESENT ILLNESS
[FreeTextEntry1] : \par   \par 1. HTN: on medications, controlled.\par 2. Hyperlipidemia: on statin. No muscle pain is noted.\par 3. CAD CABG: patient is on ASA. The patient was admitted to [facility] in 2019. Echo there showed moderate MR, moderate to severe TR, pHTN. \par Patient underwent cardiac cath at [facility] on  that showed patent grafts with non-obstructive CAD.\par \par The patient was away for 1.5 years in the [facility]. He was found to have a small pleural effusion. \par  \par He received his COVID vaccine. \par \par The patient denies CP or SOB. No cough or fevers noted.

## 2021-08-19 NOTE — DISCUSSION/SUMMARY
[FreeTextEntry1] : 75 M HTN DM hyperlipidemia CAD CABG for f/u. \par Continue medications for HTN.\par Continue statin for hyperlipidemia.\par Continue ASA for CAD.\par Continue DM control.\par Patient received his COVID vaccine. \par

## 2021-08-19 NOTE — PHYSICAL EXAM
[Well Developed] : well developed [Well Nourished] : well nourished [No Acute Distress] : no acute distress [Normal Conjunctiva] : normal conjunctiva [No Carotid Bruit] : no carotid bruit [Normal Venous Pressure] : normal venous pressure [Normal S1, S2] : normal S1, S2 [No Rub] : no rub [No Gallop] : no gallop [Clear Lung Fields] : clear lung fields [Good Air Entry] : good air entry [No Respiratory Distress] : no respiratory distress  [Soft] : abdomen soft [Non Tender] : non-tender [No Masses/organomegaly] : no masses/organomegaly [Normal Bowel Sounds] : normal bowel sounds [Normal Gait] : normal gait [No Edema] : no edema [No Cyanosis] : no cyanosis [No Clubbing] : no clubbing [No Rash] : no rash [No Varicosities] : no varicosities [No Skin Lesions] : no skin lesions [Moves all extremities] : moves all extremities [No Focal Deficits] : no focal deficits [Normal Speech] : normal speech [Alert and Oriented] : alert and oriented [Normal memory] : normal memory [de-identified] : midline scar

## 2021-10-28 NOTE — H&P ADULT - CONSTITUTIONAL
[facility]) ED Follow up Call    Reason for ED visit:  Ankle Pain     10/28/2021           FU appts/Provider:    Future Appointments   Date Time Provider Javier Carlos   10/28/2021 11:45 AM BAR Oliva CNP sc [id number]   10/29/2021 10:30 AM [facility] DIGITAL RM ST[facility] M[facility]MO [facility]iolog   11/4/2021 10:40 AM Gabe Valdes MD [address]   1/28/2022  1:30 PM [facility]         VOICEMAIL DOCUMENTATION - ERASE IF NOT USED  Hi, this message is for Iqra. This is Cal[place]o, [place] from [facility] office. Just calling to see how you are doing after your recent visit to the Emergency Room. [facility] wants to make sure you were able to fill any prescriptions and that you understand your discharge instructions. Please return our call if you need to make a follow up appointment with your provider or have any further needs. Our phone number is [phone]. Have a great day. Well-developed, well nourished